# Patient Record
Sex: MALE | Race: WHITE | Employment: OTHER | URBAN - METROPOLITAN AREA
[De-identification: names, ages, dates, MRNs, and addresses within clinical notes are randomized per-mention and may not be internally consistent; named-entity substitution may affect disease eponyms.]

---

## 2017-05-07 PROBLEM — Z79.899 ENCOUNTER FOR LONG-TERM (CURRENT) USE OF OTHER MEDICATIONS: Status: ACTIVE | Noted: 2017-05-07

## 2020-02-03 ENCOUNTER — OFFICE VISIT (OUTPATIENT)
Dept: NEUROLOGY | Age: 85
End: 2020-02-03

## 2020-02-03 VITALS
RESPIRATION RATE: 16 BRPM | SYSTOLIC BLOOD PRESSURE: 122 MMHG | HEIGHT: 70 IN | BODY MASS INDEX: 27.2 KG/M2 | DIASTOLIC BLOOD PRESSURE: 68 MMHG | WEIGHT: 190 LBS | OXYGEN SATURATION: 98 % | HEART RATE: 50 BPM

## 2020-02-03 DIAGNOSIS — R26.89 ABNORMALITY OF GAIT DUE TO IMPAIRMENT OF BALANCE: Primary | ICD-10-CM

## 2020-02-03 NOTE — PROGRESS NOTES
[facility]   NEW PATIENT EVALUATION/CONSULTATION       PATIENT NAME: Julian Read    MRN: [id number]    REASON FOR CONSULTATION: Recurrent falls     02/03/20      Previous records (physician notes, laboratory reports, and radiology reports) and imaging studies were reviewed and summarized. My recommendations will be communicated back to the patient's physician(s) via electronic medical record and/or by [address] mail. HISTORY OF PRESENT ILLNESS:  Julian Read is a 80 y.o. right handed male presenting for evaluation of a history of intermittent, increasing falls. While exact onset is unclear to me, patient has had progressive difficulty with mobility over the past few years such that he is assume the use of multiple forms of adaptive equipment. Started with a cane and now has progressed to a rolling walker, which he says is never been prescribed but which he got on his own, a point of great pride. In regards to his falls, he describes 2 mechanisms one is when he is sitting on the side of the bed or getting up in the morning or for example after sitting in a ball game when he stands and his legs feel dead and want to give out. Does not note involvement of the arms any change in cognition or any other presyncopal symptoms during these episodes. He also does not note dizziness during them. These are reported to happen infrequently. The second form of falling appears to occur with quick turns or rapid shift in position. Again he denies any associated symptoms but will lose his balance/lower extremity strength himself falling to the ground and laid himself down. With the the form he is unable to get back up without any change in consciousness during either. Fortunately has not suffered any significant head or orthopedic injury to this point and was referred to neurology clinic over concern for possible neurologic etiology to these episodes.   Mr. Simone Waller does endorse a history of what sounds like benign paroxysmal positional vertigo in the past though it is currently resolved as far as he knows. He is also active at [employer] in both strength training as well as chair yoga with degree of exertion in between these periods unknown. He denies any sensory disturbance in his feet, denies difficulty in a dimly lit room or in the shower. Likewise he denies any radicular complaints, any stiffness of his legs or bladder or bowel incontinence. No difficulty with his upper extremities. He denies any tremor, changes in gait, or other parkinsonian features. No history of sudden symptom onset rather progressive over time. PAST MEDICAL HISTORY:  Past Medical History:   Diagnosis Date    Colon polyp     Colon polyps     Essential hypertension     Prostate CA ([facility] [address] 2000    RP       PAST SURGICAL HISTORY:  Past Surgical History:   Procedure Laterality Date    ENDOSCOPY, COLON, DIAGNOSTIC      11/05 Pos. Polyps    ENDOSCOPY, COLON, DIAGNOSTIC  12/2010    partial adenoma/.  Rep 3 yrs.  HX PROSTATECTOMY      prostate ca. Radical prostatectomy 2000       FAMILY HISTORY:   Family History   Problem Relation Age of Onset    Heart Disease Mother          SOCIAL HISTORY:  Social History     Socioeconomic History    Marital status: UNKNOWN     Spouse name: Not on file    Number of children: Not on file    Years of education: Not on file    Highest education level: Not on file   Tobacco Use    Smoking status: Never Smoker    Smokeless tobacco: Never Used   Substance and Sexual Activity    Alcohol use:  Yes     Alcohol/week: 11.7 standard drinks     Types: 14 Shots of liquor per week     Comment: 2 beers a day    Drug use: No    Sexual activity: Yes     Partners: Female   Social History Narrative    ** Merged History Encounter **              MEDICATIONS:   Current Outpatient Medications   Medication Sig Dispense Refill    verapamil ER (CALAN-SR) 240 mg CR tablet TAKE 1 TABLET BY MOUTH EVERY DAY 90 Tab 3    sildenafil citrate (VIAGRA) 100 mg tablet Take 1 Tab by mouth daily as needed. 6 Tab 5    cholecalciferol (VITAMIN D3) 1,000 unit cap Take  by mouth daily.  vitamin e 400 unit Tab Take  by mouth.  ascorbic acid (VITAMIN C) 500 mg tablet Take  by mouth. ALLERGIES:  Allergies   Allergen Reactions    Pcn [Penicillins] Unknown (comments)         REVIEW OF SYSTEMS:  Review of Systems   Constitutional: Negative for chills, fever and weight loss. HENT: Positive for hearing loss. Negative for congestion, sore throat and tinnitus. Eyes: Negative for blurred vision, double vision and photophobia. Respiratory: Negative for cough, shortness of breath and wheezing. Cardiovascular: Negative for chest pain and palpitations. Gastrointestinal: Negative for heartburn, nausea and vomiting. Genitourinary: Negative for dysuria. Musculoskeletal: Positive for falls. Negative for back pain, joint pain, myalgias and neck pain. Skin: Negative for rash. Neurological: Positive for dizziness, seizures and weakness. Negative for tingling, tremors, sensory change, speech change, focal weakness, loss of consciousness and headaches. Endo/Heme/Allergies: Does not bruise/bleed easily. Psychiatric/Behavioral: Negative. PHYSICAL EXAM:  Vital Signs:   Visit Vitals  /68   Pulse (!) 50   Resp 16   Ht 5' 10\" (1.778 m)   Wt 86.2 kg (190 lb)   SpO2 98%   BMI 27.26 kg/m²        General Medical Exam:  General:  Well appearing, comfortable, in no apparent distress. Eyes/ENT: see cranial nerve examination. Neck: No masses appreciated. Full range of motion without tenderness. Respiratory:  Clear to auscultation, good air entry bilaterally. Cardiac:  Regular rate and rhythm, no murmur. GI:  Soft, non-tender, non-distended abdomen. Bowel sounds normal. No masses, organomegaly. Extremities:  No deformities, 1 to 2+ pitting edema noted from distal leg and beyond. Chronic skin color changes noted in distal feet with non-palpable DP/PT pulses. Skin:  No rashes or lesions. Neurological:  · Mental Status:  Alert and oriented to person, place, and time. Attention intact. Speech clear without dysarthria or hypophonia. Language fluent without evidence for aphasia. · Cranial Nerves:   CNII/III/IV/VI: visual fields full to confrontation, EOMI, PERRL, no ptosis or nystagmus. CN V: Facial sensation intact bilaterally   CN VII: Facial muscles symmetric and strong   CN VIII: Hearing intact to spoken voice  CN IX/X: Normal palatal movement   CN XI: Full strength shoulder shrug bilaterally   CN XII: Tongue protrusion full and midline without fasciculation or atrophy  · Motor: Normal tone and muscle bulk with no pronator drift. Individual muscle group testing:  Shoulder abduction:   Left:5/5   Right : 5/5    Elbow flexion:      Left:5/5   Right : 5/5  Elbow extension:    Left:5/5   Right : 5/5   Wrist flexion:    Left:5/5   Right : 5/5  Wrist extension:    Left:5/5   Right : 5/5  Finger flexion:    Left:5/5   Right : 5/5    Finger extension:   Left:5/5   Right : 5/5   Finger abduction:  Left:5/5   Right : 5/5   Finger adduction:   Left:5/5   Right : 5/5  Hip flexion:     Left:5/5   Right : 5/5          Knee flexion:    Left:5/5   Right : 5/5    Knee extension:   Left:5/5   Right : 5/5    Dorsiflexion:     Left:5/5   Right : 5/5  Plantar flexion:    Left:5/5   Right : 5/5      · MSRs: No crossed adductors or clonus. RIGHT  LEFT   Brachioradialis 1+ 1+   Biceps 1+ 1+   Triceps 1+ 1+   Knee 2+ 3+   Achilles 0 0        Plantar response Neutral Neutral     · Sensation: Normal and symmetric perception of temperature, light touch, proprioception appears grossly intact to great toes, patient is unable to appreciate vibratory sensation at the great toes or ankles. Romberg not tested  · Coordination: No dysmetria.  Normal rapid alternating movements; finger-to-nose intact in upper extremities  · Gait: Ambulates with a slightly wide base with a waddling component, tandem and heel/toe ambulation not attempted    PERTINENT DATA:  None    ASSESSMENT:        PLAN:  Gait impairment:  Appears multifactorial, likely secondary to distal sensory loss, mild lower extremity weakness and perhaps chronic vestibular impairment  Regarding weakness, there are no striking myelopathic signs other than cross-adduction in left leg, discussed with patient/family that given absence of critical weakness, autonomic dysfunction or pain, would not be recommended to pursue surgical opinion which family agreed with  As such, mentioned I would not recommend MRI given that treatment (therapy) would be the same regardless  Regarding vestibular dysfunction, patient has symptomatic vertigo transient with Almond-Hallpike to the left with history of prior history suggestive of benign paroxysmal positional vertigo, would recommend vestibular therapy/rehabilitation for this purpose, which should be able to address component of lower extremity weakness as well  Lastly, pending blood pressure trends/Raynaud's, may consider decreasing verapamil dose given that some of his episodes of 'legs giving out' appear related to standing from a supine, seated position and thus exaggerated orthostasis could be considered    Dependent edema:  Patient's blood pressures appear well controlled over time, denies any difficulty with cardiac renal or liver dysfunction  Suggested that patient to bring up with his family practitioner mention that it may be secondary to verapamil unlikely related to falls    No need for ongoing Neurology follow-up at this time    Thank you for the opportunity to participate in the care of Mr. Marcelino Nugent, please feel free to reach out with questions, concerns     I have discussed the diagnosis with the patient and the intended plan as seen in the above orders. Patient is in agreement.  The patient has received an after-visit summary and questions were answered concerning future plans. I have discussed medication side effects and warnings with the patient as well. Silke Sy MD       CC Referring provider:  No referring provider defined for this encounter.     Jessica Carballo MD

## 2020-02-03 NOTE — LETTER
2/3/20 Patient: Pascual Fair YOB: 1934 Date of Visit: 2/3/2020 Rizwan Koroma MD 
[facility] [phone] VIA In Basket Dear Rizwan Koroma MD, Thank you for referring Mr. Jordana Ford to [facility] for evaluation. My notes for this consultation are attached. If you have questions, please do not hesitate to call me. I look forward to following your patient along with you. Sincerely, Felipe Morillo MD

## 2020-02-03 NOTE — PROGRESS NOTES
Mr. Lizzie Powell presents today as a new patient for evaluation of unsteady gait. Depression screening done on patient.

## 2022-03-19 PROBLEM — Z79.899 ENCOUNTER FOR LONG-TERM (CURRENT) DRUG USE: Status: ACTIVE | Noted: 2017-05-07

## 2024-02-13 ENCOUNTER — TRANSCRIBE ORDERS (OUTPATIENT)
Facility: HOSPITAL | Age: 89
End: 2024-02-13

## 2024-02-13 DIAGNOSIS — R10.84 ABDOMINAL PAIN, GENERALIZED: Primary | ICD-10-CM

## 2024-02-13 DIAGNOSIS — R11.0 NAUSEA: ICD-10-CM

## 2024-02-13 DIAGNOSIS — R63.4 ABNORMAL WEIGHT LOSS: ICD-10-CM

## 2024-02-21 ENCOUNTER — HOSPITAL ENCOUNTER (OUTPATIENT)
Age: 89
Discharge: HOME | End: 2024-02-24
Payer: MEDICARE

## 2024-02-21 DIAGNOSIS — R11.0 NAUSEA: ICD-10-CM

## 2024-02-21 DIAGNOSIS — R63.4 ABNORMAL WEIGHT LOSS: ICD-10-CM

## 2024-02-21 DIAGNOSIS — R10.84 ABDOMINAL PAIN, GENERALIZED: ICD-10-CM

## 2024-02-21 LAB — CREAT BLD-MCNC: 1 MG/DL (ref 0.6–1.3)

## 2024-02-21 PROCEDURE — 82565 ASSAY OF CREATININE: CPT

## 2024-02-21 PROCEDURE — 74177 CT ABD & PELVIS W/CONTRAST: CPT

## 2024-02-21 PROCEDURE — 6360000004 HC RX CONTRAST MEDICATION: Performed by: RADIOLOGY

## 2024-02-21 RX ADMIN — IOPAMIDOL 100 ML: 755 INJECTION, SOLUTION INTRAVENOUS at 10:47
